# Patient Record
Sex: MALE | Race: WHITE
[De-identification: names, ages, dates, MRNs, and addresses within clinical notes are randomized per-mention and may not be internally consistent; named-entity substitution may affect disease eponyms.]

---

## 2018-05-28 ENCOUNTER — HOSPITAL ENCOUNTER (EMERGENCY)
Dept: HOSPITAL 80 - FED | Age: 28
Discharge: HOME | End: 2018-05-28
Payer: COMMERCIAL

## 2018-05-28 VITALS — DIASTOLIC BLOOD PRESSURE: 64 MMHG | SYSTOLIC BLOOD PRESSURE: 147 MMHG

## 2018-05-28 DIAGNOSIS — R42: Primary | ICD-10-CM

## 2018-05-28 NOTE — CPEKG
Heart Rate: 70

RR Interval: 857

P-R Interval: 180

QRSD Interval: 116

QT Interval: 408

QTC Interval: 441

P Axis: 54

QRS Axis: 81

T Wave Axis: 57

EKG Severity - ABNORMAL ECG -

EKG Impression: SINUS RHYTHM

EKG Impression: NONSPECIFIC INTRAVENTRICULAR CONDUCTION DELAY

Electronically Signed By: Janett Medina 28-May-2018 15:11:50

## 2018-05-28 NOTE — EDPHY
H & P


Stated Complaint: c/o Dizzyness /N/V 30 Min


Time Seen by Provider: 05/28/18 10:37


HPI/ROS: 





CHIEF COMPLAINT:  Vertigo





HISTORY OF PRESENT ILLNESS:  28-year-old male presents with vertigo.  Onset of 

a room spinning sensation this morning, associated with nausea.  The vertigo 

gradually increased throughout the day and is associated with vomiting.  The 

vertigo increases with head and eye movement.  Feels ok if remains still.  No 

headache, numbness, weakness or other associated symptoms.  No recent head/neck 

trauma and no chiropractic manipulation.  History of prior similar symptoms.





REVIEW OF SYSTEMS:  complete 10 point ROS negative except at noted in the HPI








- Personal History


Current Tetanus Diphtheria and Acellular Pertussis (TDAP): Yes





- Medical/Surgical History


Hx Asthma: No


Hx Chronic Respiratory Disease: No


Hx Diabetes: No


Hx Cardiac Disease: No


Hx Renal Disease: No


Hx Cirrhosis: No


Hx Alcoholism: No


Hx HIV/AIDS: No


Hx Splenectomy or Spleen Trauma: No


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





- Social History


Smoking Status: Never smoked





- Physical Exam


Exam: 





General Appearance:  Alert, pleasant


Eyes:  Pupils equal and round, no conjunctival pallor or injection, horizontal 

nystagmus


ENT, Mouth:  Mucous membranes moist


Neck:  Normal inspection


Respiratory:  Lungs are clear to auscultation


Cardiovascular:  Regular rate and rhythm


Gastrointestinal:  Abdomen is soft and nontender


Neurological:  Alert, oriented x3, cranial nerves II through XII intact, motor 5

/5, sensory intact to light touch, normal gait


Skin:  Warm and dry


Extremities:  Normal inspection


Psychiatric:  Mood and affect normal





Constitutional: 


 Initial Vital Signs











Temperature (C)  36.6 C   05/28/18 10:20


 


Heart Rate  67   05/28/18 10:20


 


Respiratory Rate  18   05/28/18 10:20


 


Blood Pressure  120/88 H  05/28/18 10:20


 


O2 Sat (%)  96   05/28/18 10:20








 











O2 Delivery Mode               Room Air














Allergies/Adverse Reactions: 


 





No Known Allergies Allergy (Unverified 05/28/18 10:19)


 








Home Medications: 














 Medication  Instructions  Recorded


 


Carbamazepine  05/28/18


 


Ondansetron Odt [Zofran Odt 4 mg 4 mg PO Q4 PRN #6 tab 05/28/18





(*)]  


 


Zoloft 50mg (*)  05/28/18














Medical Decision Making





- Diagnostics


EKG Interpretation: 





EKG interpreted by me reveals normal sinus rhythm, rate 70, no ST or T segment 

changes.





ED Course/Re-evaluation: 





This patient presents with vertigo.  Neurologic exam is normal and there are no 

concerning signs or symptoms suggestive of central etiology.  Stat EKG reveals 

no evidence of ischemia or dysrhythmia.  Meclizine 25 mg orally given.  Much 

better after Meclizine.  Upon d/c home, IV d/c'd, pt stood up and vomited. 

Zofran 4mg ODT given.  Better after Zofran, nausea resolved.  Able to walk with 

a steady gait.  Return precautions given.  f/u PCP and ENT if sx persist. 


Differential Diagnosis: 





Differential diagnosis includes TIA, stroke, intracranial hemorrhage, tumor, 

electrolyte abnormality and acute labyrinthitis.





- Data Points


Medications Given: 


 








Discontinued Medications





Meclizine HCl (Meclizine Hcl)  25 mg PO EDNOW ONE


   Stop: 05/28/18 10:46


   Last Admin: 05/28/18 10:58 Dose:  25 mg


Ondansetron HCl (Zofran Odt)  4 mg PO EDNOW ONE


   Stop: 05/28/18 12:16


   Last Admin: 05/28/18 12:15 Dose:  4 mg








Departure





- Departure


Disposition: Home, Routine, Self-Care


Clinical Impression: 


 Vertigo





Condition: Good


Instructions:  Vertigo (ED)


Additional Instructions: 


Take meclizine 25 mg orally every 6 hr as needed for vertigo.





Referrals: 


Navin Saavedra MD [Medical Doctor] - 2-3 days, if not improved


Prescriptions: 


Ondansetron Odt [Zofran Odt 4 mg (*)] 4 mg PO Q4 PRN #6 tab


 PRN Reason: Nausea